# Patient Record
Sex: MALE | Race: WHITE | NOT HISPANIC OR LATINO | Employment: OTHER | URBAN - METROPOLITAN AREA
[De-identification: names, ages, dates, MRNs, and addresses within clinical notes are randomized per-mention and may not be internally consistent; named-entity substitution may affect disease eponyms.]

---

## 2017-01-01 ENCOUNTER — TELEPHONE (OUTPATIENT)
Dept: NEUROSURGERY | Facility: CLINIC | Age: 82
End: 2017-01-01

## 2017-01-01 ENCOUNTER — TELEPHONE (OUTPATIENT)
Dept: PHARMACY | Facility: CLINIC | Age: 82
End: 2017-01-01

## 2017-01-01 ENCOUNTER — HOSPITAL ENCOUNTER (OUTPATIENT)
Dept: RADIOLOGY | Facility: HOSPITAL | Age: 82
Discharge: HOME | End: 2017-01-17
Attending: NEUROLOGICAL SURGERY
Payer: MEDICARE

## 2017-01-01 ENCOUNTER — OFFICE VISIT (OUTPATIENT)
Dept: ENDOCRINOLOGY | Facility: CLINIC | Age: 82
End: 2017-01-01
Payer: MEDICARE

## 2017-01-01 ENCOUNTER — OFFICE VISIT (OUTPATIENT)
Dept: NEUROSURGERY | Facility: CLINIC | Age: 82
End: 2017-01-01
Payer: MEDICARE

## 2017-01-01 VITALS
SYSTOLIC BLOOD PRESSURE: 157 MMHG | DIASTOLIC BLOOD PRESSURE: 83 MMHG | SYSTOLIC BLOOD PRESSURE: 157 MMHG | HEIGHT: 70 IN | BODY MASS INDEX: 31.62 KG/M2 | WEIGHT: 220.88 LBS | BODY MASS INDEX: 31.62 KG/M2 | DIASTOLIC BLOOD PRESSURE: 83 MMHG | HEART RATE: 53 BPM | HEIGHT: 70 IN | WEIGHT: 220.88 LBS | HEART RATE: 53 BPM

## 2017-01-01 DIAGNOSIS — E23.6 PITUITARY MASS: Primary | ICD-10-CM

## 2017-01-01 DIAGNOSIS — D49.7 PITUITARY TUMOR: Primary | ICD-10-CM

## 2017-01-01 DIAGNOSIS — N28.9 RENAL IMPAIRMENT: ICD-10-CM

## 2017-01-01 DIAGNOSIS — E23.6 PITUITARY MASS: ICD-10-CM

## 2017-01-01 DIAGNOSIS — D35.2 BENIGN NEOPLASM OF PITUITARY GLAND: ICD-10-CM

## 2017-01-01 DIAGNOSIS — I10 ESSENTIAL HYPERTENSION: ICD-10-CM

## 2017-01-01 LAB
CREAT SERPL-MCNC: 1.5 MG/DL (ref 0.5–1.4)
SAMPLE: ABNORMAL

## 2017-01-01 PROCEDURE — 99999 PR PBB SHADOW E&M-EST. PATIENT-LVL III: CPT | Mod: PBBFAC,,, | Performed by: NEUROLOGICAL SURGERY

## 2017-01-01 PROCEDURE — 99213 OFFICE O/P EST LOW 20 MIN: CPT | Mod: PBBFAC,27 | Performed by: INTERNAL MEDICINE

## 2017-01-01 PROCEDURE — 99213 OFFICE O/P EST LOW 20 MIN: CPT | Mod: S$PBB,,, | Performed by: NEUROLOGICAL SURGERY

## 2017-01-01 PROCEDURE — 70553 MRI BRAIN STEM W/O & W/DYE: CPT | Mod: 26,GC,, | Performed by: RADIOLOGY

## 2017-01-01 PROCEDURE — 99214 OFFICE O/P EST MOD 30 MIN: CPT | Mod: S$PBB,,, | Performed by: INTERNAL MEDICINE

## 2017-01-01 PROCEDURE — 99213 OFFICE O/P EST LOW 20 MIN: CPT | Mod: PBBFAC | Performed by: NEUROLOGICAL SURGERY

## 2017-01-01 PROCEDURE — 99999 PR PBB SHADOW E&M-EST. PATIENT-LVL III: CPT | Mod: PBBFAC,,, | Performed by: INTERNAL MEDICINE

## 2017-01-01 RX ORDER — GADOBUTROL 604.72 MG/ML
5 INJECTION INTRAVENOUS
Status: COMPLETED | OUTPATIENT
Start: 2017-01-01 | End: 2017-01-01

## 2017-01-01 RX ADMIN — GADOBUTROL 5 ML: 604.72 INJECTION INTRAVENOUS at 11:01

## 2017-01-17 NOTE — PROGRESS NOTES
Subjective:      Patient ID: Benji Dos Santos is a 83 y.o. male.    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      Was getting ophthal exam every 6 months and was ok per him      Was C/o muscle weakness  Bone pain      On Lt4 50 mcg daily    Review of Systems   Constitutional: Negative for unexpected weight change.   Eyes: Negative for visual disturbance.   Respiratory: Negative for shortness of breath.    Cardiovascular: Negative for chest pain.   Gastrointestinal: Negative for abdominal pain.   Endocrine: Positive for polyuria.   Musculoskeletal: Negative for myalgias.   Skin: Negative for wound.   Neurological: Negative for headaches.   Hematological: Does not bruise/bleed easily.   Psychiatric/Behavioral: Negative for sleep disturbance.       Objective:   Physical Exam   Constitutional: He appears well-developed.   HENT:   Right Ear: External ear normal.   Left Ear: External ear normal.   Nose: Nose normal.   Hearing normal    Neck: No tracheal deviation present. No thyromegaly present.   Pulmonary/Chest: Effort normal. No respiratory distress.   Abdominal: Soft. He exhibits no mass.   Musculoskeletal: He exhibits no edema.   Neurological: He is alert. No cranial nerve deficit or sensory deficit. Gait normal.        Skin: No rash noted.   No nodules   Psychiatric: He has a normal mood and affect. Judgment normal.   Vitals reviewed.      Lab Review:   Results for BENJI DOS SANTOS (MRN [id number]) as of 8/9/2016 04:54   Ref. Range 8/2/2016 12:51   Sodium Latest Ref Range: 136 - 145 mmol/L 140   Potassium Latest Ref Range: 3.5 - 5.1 mmol/L 3.5   Chloride Latest  Ref Range: 95 - 110 mmol/L 103   CO2 Latest Ref Range: 23 - 29 mmol/L 26   Anion Gap Latest Ref Range: 8 - 16 mmol/L 11   BUN, Bld Latest Ref Range: 8 - 23 mg/dL 21   Creatinine Latest Ref Range: 0.5 - 1.4 mg/dL 1.7 (H)   eGFR if non African American Latest Ref Range: >60 mL/min/1.73 m^2 36.5 (A)   eGFR if African American Latest Ref Range: >60 mL/min/1.73 m^2 42.2 (A)   Glucose Latest Ref Range: 70 - 110 mg/dL 117 (H)   Calcium Latest Ref Range: 8.7 - 10.5 mg/dL 9.9   Alkaline Phosphatase Latest Ref Range: 55 - 135 U/L 53 (L)   Total Protein Latest Ref Range: 6.0 - 8.4 g/dL 6.6   Albumin Latest Ref Range: 3.5 - 5.2 g/dL 4.0   Total Bilirubin Latest Ref Range: 0.1 - 1.0 mg/dL 0.9   AST Latest Ref Range: 10 - 40 U/L 29   ALT Latest Ref Range: 10 - 44 U/L 34   Cortisol -8 AM Latest Ref Range: 4.30 - 22.40 ug/dL 8.4   ACTH Latest Ref Range: 0 - 46 pg/mL <10   Growth Hormone Latest Ref Range: 0.00 - 3.00 ng/mL <0.1   TSH Latest Ref Range: 0.400 - 4.000 uIU/mL 0.847   Free T4 Latest Ref Range: 0.71 - 1.51 ng/dL 0.88   FSH Latest Ref Range: 0.95 - 11.95 mIU/mL 2.60   Prolactin Latest Ref Range: 3.5 - 19.4 ng/mL 10.2   Testosterone, Total Latest Ref Range: 195.0 - 1138.0 ng/dL 44 (L)       Assessment:     # Panhypopituitarism-   Secondary adrenal insufficiency-   AM cortisol and ACTH appropriate      Secondary hypothyroidism - clinically and biochemically euthyroid  Continue synthroid 50 mcg daily   Follow ft4 and adjust accordingly  Avoid exogenous hyperthyroidism as this can accelerate bone loss      Secondary hypogonadism  Testosterone replacement will hold off for now. As risks of worsening CAD and BPH      Adult GH deficiency   Hold off on treatment       Fatigue/ weakness, muscle wasting      Neurology referral     Plan:     Follow up:  RTC in 6 months with blood work

## 2017-01-17 NOTE — PROGRESS NOTES
Subjective:    I, Irasema Holland, am scribing for, and in the presence of, Dr. Yoshi Arriaza.     Patient ID: Ruma Dos Santos is a 83 y.o. male.    Chief Complaint: No chief complaint on file.    HPI This is a 83-year-old man who presents today for follow up with labs and MRI of the brain. The patient is status post endonasal transsphenoidal resection of pituitary tumor. He reports increased difficulty with concentration and memory since last office visit.    He presents today in a wheelchair, which he does not use at home. Per family, the patient has become progressively weaker over the last 7-8 months. He is extremely slow when walking with imbalance. He is able to walk only a couple of 100 feet before he needs to rest. He has had approximately 3 falls in the past 6 months. The patient complains of bilateral leg pain. His family has also noticed hand weakness with muscle wasting of the hands and the patient had difficulty turning the key in the ignition. He denies changes in appetite, lightheadedness, or headaches.    He does suffer with nocturia and wakes approximately every 4 hours with 3-4 episodes of urination at night. He has history of prostate problems and has suffered with this for sometime.     The patient has a rash with exfoliation on his cheeks, which has improved with Dermatology care. He has schedule follow up with Dermatology.      The patient has history of low back pain with lumbar stenosis. He has recently received epidural steroid injections and rhizotomies for this pain. His last treatment was GABI approximately 3 weeks ago. He has not participated in Physical Therapy. He reports evaluation for lumbar surgery, but states his pain was non-surgical in nature.     Review of Systems   Constitutional: Negative for activity change, fatigue and fever.   HENT: Negative for facial swelling.    Eyes: Negative.    Respiratory: Negative.    Cardiovascular: Negative.    Gastrointestinal: Negative for  "diarrhea, nausea and vomiting.   Genitourinary:        Nocturia   Musculoskeletal: Positive for back pain, gait problem and myalgias (bilateral LE pain). Negative for joint swelling.   Skin: Positive for rash (face).   Neurological: Positive for weakness (bilateral LE weakness; hand weakness). Negative for seizures, numbness and headaches.        Imbalance   Psychiatric/Behavioral: Positive for decreased concentration.        Difficulty with memory       Past Medical History   Diagnosis Date    Glaucoma      Objective:       Visit Vitals    BP (!) 157/83    Pulse (!) 53    Ht 5' 10" (1.778 m)    Wt 100.2 kg (220 lb 14.4 oz)    BMI 31.7 kg/m2       Physical Exam   Constitutional: He is oriented to person, place, and time. He appears well-developed and well-nourished.   HENT:   Head: Normocephalic and atraumatic.   Neck: Neck supple.   Musculoskeletal:   Muscle wasting of bilateral hands   Neurological: He is alert and oriented to person, place, and time. No cranial nerve deficit. He displays a negative Romberg sign. GCS eye subscore is 4. GCS verbal subscore is 5. GCS motor subscore is 6.       Imaging:  MRI of the brain, dated 1/17/2017, shows postoperative changes of endonasal transsphenoidal resection of pituitary tumor with small residual tumor with decompression. There is no evidence of recurrence    I have personally reviewed the images with the pt.      I, Dr. Yoshi Arriaza, personally performed the services described in this documentation as scribed by Irasema Holland in my presence, and it is both accurate and complete.  Assessment:       Pituitary adenoma.    Plan:   I have reviewed the MRI of the brain with the patient, which shows postoperative changes of endonasal transsphenoidal resection of pituitary tumor with small residual tumor with decompression. The patient's function has regressed since last follow up. He was on steroids at last visit, which he has finished. The steroid use may have masked " problems causing his weakness. I have explained that there are various pathologies that could be causing his symptoms. I have asked the patient to follow up with a Neurologist in [place], [place]. If he does not get an answer regarding his symptomatology, he should contact me and I can refer him to Neurology here.    I will schedule the patient follow up in 1 year with repeat MRI of the brain.

## 2017-01-17 NOTE — PATIENT INSTRUCTIONS
I have reviewed the MRI of the brain with the patient, which shows postoperative changes of endonasal transsphenoidal resection of pituitary tumor with small residual tumor with decompression. The patient's function has regressed since last follow up. He was on steroids at last visit, which he has finished. The steroid use may have masked problems causing his weakness. I have explained that there are various pathologies that could be causing his symptoms. I have asked the patient to follow up with a Neurologist in [place], [place]. If he does not get an answer regarding his symptomatology, he should contact me and I can refer him to Neurology here.    I will schedule the patient follow up in 1 year with repeat MRI of the brain.

## 2017-01-21 PROBLEM — N18.9 CRF (CHRONIC RENAL FAILURE): Status: ACTIVE | Noted: 2017-01-01

## 2017-01-21 PROBLEM — I48.91 ATRIAL FIBRILLATION: Status: ACTIVE | Noted: 2017-01-01

## 2017-01-21 PROBLEM — R56.9 SEIZURE: Status: ACTIVE | Noted: 2017-01-01

## 2017-01-21 PROBLEM — I63.9 CEREBROVASCULAR ACCIDENT (CVA): Status: ACTIVE | Noted: 2017-01-01

## 2017-01-23 PROBLEM — C79.31 METASTASIS TO BRAIN: Status: ACTIVE | Noted: 2017-01-01

## 2017-01-24 PROBLEM — I48.0 PAROXYSMAL ATRIAL FIBRILLATION: Status: ACTIVE | Noted: 2017-01-01

## 2017-01-26 PROBLEM — C34.12 MALIGNANT NEOPLASM OF UPPER LOBE OF LEFT LUNG: Status: ACTIVE | Noted: 2017-01-01

## 2017-02-13 PROBLEM — C77.1 SECONDARY AND UNSPECIFIED MALIGNANT NEOPLASM OF INTRATHORACIC LYMPH NODES: Status: ACTIVE | Noted: 2017-01-01

## 2017-02-17 NOTE — TELEPHONE ENCOUNTER
DOCUMENTATION ONLY:  [facility] Approved 2/15/17  Approval Dates: 2/14/17-8/14/17  PA#: [id number]  $2,730.36 copay

## 2017-02-27 NOTE — TELEPHONE ENCOUNTER
Called patient's wife to inform her of scheduled gamma knife procedure. Scheduled on 3/3/17 for 7:00 am. Surgery in calendar. No answer. LVM. Will attempt to contact pt once more

## 2017-03-01 NOTE — TELEPHONE ENCOUNTER
----- Message from Erasto Persaud sent at 2/27/2017 12:25 PM CST -----  Contact: Pt Daughter (Marco Antonio )   Pt daughter  would like nurse to give her a call  Back in regards to Pt not getting procedure done .. Contact number [phone]..

## 2017-03-01 NOTE — TELEPHONE ENCOUNTER
----- Message from Mario Alberto Killian sent at 2/27/2017  8:15 AM CST -----  Contact: Spouse-  Would like someone to call her, regarding an appointment. She has a few questions.     Call: [phone]

## 2017-03-01 NOTE — TELEPHONE ENCOUNTER
Patient's daughter, Marco Antonio, requesting information as to why Gamma Knife procedure was not completed. Contact information for Yolanda Hall given to daughter for clarification as to why procedure was not completed. Thanked RN for calling.

## 2017-03-03 PROBLEM — J96.01 ACUTE HYPOXEMIC RESPIRATORY FAILURE: Status: ACTIVE | Noted: 2017-01-01

## 2017-03-03 PROBLEM — R09.02 HYPOXIA: Status: ACTIVE | Noted: 2017-01-01

## 2017-03-03 PROBLEM — G40.909 SEIZURE DISORDER: Status: ACTIVE | Noted: 2017-01-01

## 2017-03-03 PROBLEM — J18.9 PNEUMONIA OF RIGHT UPPER LOBE DUE TO INFECTIOUS ORGANISM: Status: ACTIVE | Noted: 2017-01-01

## 2017-03-03 PROBLEM — J18.9 PNEUMONIA: Status: ACTIVE | Noted: 2017-01-01

## 2017-03-03 PROBLEM — E86.0 DEHYDRATION: Status: ACTIVE | Noted: 2017-01-01

## 2017-03-04 PROBLEM — C34.91 ADENOCARCINOMA OF RIGHT LUNG, STAGE 4: Status: ACTIVE | Noted: 2017-01-01

## 2017-03-05 PROBLEM — J96.90 RESPIRATORY FAILURE: Status: ACTIVE | Noted: 2017-01-01

## 2017-03-10 ENCOUNTER — TELEPHONE (OUTPATIENT)
Dept: INTERNAL MEDICINE | Facility: CLINIC | Age: 82
End: 2017-03-10

## 2017-03-11 NOTE — TELEPHONE ENCOUNTER
Received message-Patient Ruma Dos Santos  on [facility].   I saw him for pre op evaluation in 2016    Chart reviewed- Patient with metastatic lung cancer with metastatic disease to the brain status post gamma knife surgery admitted to the ICU for respiratory failure.Was discharged on inpatient hospice     Called to speak to family. Spoke to daughter in law. He was comfortable towards the end. Requested the daughter in law to pass on my condolences to his wife